# Patient Record
Sex: MALE | URBAN - METROPOLITAN AREA
[De-identification: names, ages, dates, MRNs, and addresses within clinical notes are randomized per-mention and may not be internally consistent; named-entity substitution may affect disease eponyms.]

---

## 2021-08-11 ENCOUNTER — APPOINTMENT (RX ONLY)
Dept: URBAN - METROPOLITAN AREA CLINIC 369 | Facility: CLINIC | Age: 26
Setting detail: DERMATOLOGY
End: 2021-08-11

## 2021-08-11 DIAGNOSIS — L738 OTHER SPECIFIED DISEASES OF HAIR AND HAIR FOLLICLES: ICD-10-CM

## 2021-08-11 DIAGNOSIS — L663 OTHER SPECIFIED DISEASES OF HAIR AND HAIR FOLLICLES: ICD-10-CM

## 2021-08-11 DIAGNOSIS — L73.9 FOLLICULAR DISORDER, UNSPECIFIED: ICD-10-CM

## 2021-08-11 DIAGNOSIS — A63.0 ANOGENITAL (VENEREAL) WARTS: ICD-10-CM

## 2021-08-11 PROBLEM — L02.426 FURUNCLE OF LEFT LOWER LIMB: Status: ACTIVE | Noted: 2021-08-11

## 2021-08-11 PROBLEM — L02.425 FURUNCLE OF RIGHT LOWER LIMB: Status: ACTIVE | Noted: 2021-08-11

## 2021-08-11 PROCEDURE — ? LIQUID NITROGEN

## 2021-08-11 PROCEDURE — ? TREATMENT REGIMEN

## 2021-08-11 PROCEDURE — 99203 OFFICE O/P NEW LOW 30 MIN: CPT | Mod: 25

## 2021-08-11 PROCEDURE — 17110 DESTRUCTION B9 LES UP TO 14: CPT

## 2021-08-11 PROCEDURE — ? COUNSELING

## 2021-08-11 PROCEDURE — ? PRESCRIPTION

## 2021-08-11 RX ORDER — CLINDAMYCIN PHOSPHATE 10 MG/ML
LOTION TOPICAL
Qty: 1 | Refills: 1 | Status: ERX | COMMUNITY
Start: 2021-08-11

## 2021-08-11 RX ORDER — DOXYCYCLINE 40 MG/1
CAPSULE ORAL
Qty: 30 | Refills: 1 | Status: ERX | COMMUNITY
Start: 2021-08-11

## 2021-08-11 RX ADMIN — DOXYCYCLINE: 40 CAPSULE ORAL at 00:00

## 2021-08-11 RX ADMIN — CLINDAMYCIN PHOSPHATE: 10 LOTION TOPICAL at 00:00

## 2021-08-11 ASSESSMENT — LOCATION SIMPLE DESCRIPTION DERM
LOCATION SIMPLE: GROIN
LOCATION SIMPLE: RIGHT THIGH
LOCATION SIMPLE: SCROTUM
LOCATION SIMPLE: PENIS
LOCATION SIMPLE: LEFT THIGH

## 2021-08-11 ASSESSMENT — LOCATION ZONE DERM
LOCATION ZONE: PENIS
LOCATION ZONE: LEG
LOCATION ZONE: GENITALIA
LOCATION ZONE: TRUNK

## 2021-08-11 ASSESSMENT — LOCATION DETAILED DESCRIPTION DERM
LOCATION DETAILED: RIGHT ANTERIOR SCROTUM
LOCATION DETAILED: RIGHT ANTERIOR DISTAL THIGH
LOCATION DETAILED: LEFT ANTERIOR DISTAL THIGH
LOCATION DETAILED: SUPRAPUBIC SKIN
LOCATION DETAILED: BASE OF THE PENIS

## 2021-08-11 NOTE — PROCEDURE: LIQUID NITROGEN
Duration Of Freeze Thaw-Cycle (Seconds): 3
Show Aperture Variable?: Yes
Render Post-Care Instructions In Note?: no
Consent: The patient's consent was obtained including but not limited to risks of crusting, scabbing, blistering, scarring, darker or lighter pigmentary change, recurrence, incomplete removal and infection.
Number Of Freeze-Thaw Cycles: 3 freeze-thaw cycles
Post-Care Instructions: I reviewed with the patient in detail post-care instructions. Patient is to wear sunprotection, and avoid picking at any of the treated lesions. Pt may apply Vaseline to crusted or scabbing areas.
Detail Level: Detailed
Medical Necessity Clause: This procedure was medically necessary because the lesions that were treated were:
Medical Necessity Information: It is in your best interest to select a reason for this procedure from the list below. All of these items fulfill various CMS LCD requirements except the new and changing color options.

## 2021-08-11 NOTE — PROCEDURE: TREATMENT REGIMEN
Initiate Treatment: Take 1 tablet of Oracea daily with food and water\\nApply Clindamycin lotion to affected areas on legs twice daily \\nApply Cicalfate to open wounds daily
Detail Level: Zone

## 2021-09-10 ENCOUNTER — APPOINTMENT (RX ONLY)
Dept: URBAN - METROPOLITAN AREA CLINIC 369 | Facility: CLINIC | Age: 26
Setting detail: DERMATOLOGY
End: 2021-09-10

## 2021-09-10 DIAGNOSIS — A63.0 ANOGENITAL (VENEREAL) WARTS: ICD-10-CM | Status: IMPROVED

## 2021-09-10 PROCEDURE — ? COUNSELING

## 2021-09-10 PROCEDURE — ? LIQUID NITROGEN

## 2021-09-10 PROCEDURE — 17110 DESTRUCTION B9 LES UP TO 14: CPT

## 2021-09-10 ASSESSMENT — LOCATION ZONE DERM: LOCATION ZONE: TRUNK

## 2021-09-10 ASSESSMENT — LOCATION SIMPLE DESCRIPTION DERM: LOCATION SIMPLE: GROIN

## 2021-09-10 ASSESSMENT — LOCATION DETAILED DESCRIPTION DERM: LOCATION DETAILED: SUPRAPUBIC SKIN

## 2021-09-10 NOTE — PROCEDURE: LIQUID NITROGEN
Render Post-Care Instructions In Note?: no
Post-Care Instructions: I reviewed with the patient in detail post-care instructions. Patient is to wear sunprotection, and avoid picking at any of the treated lesions. Pt may apply Vaseline to crusted or scabbing areas.
Number Of Freeze-Thaw Cycles: 3 freeze-thaw cycles
Show Topical Anesthesia Variable?: Yes
Medical Necessity Information: It is in your best interest to select a reason for this procedure from the list below. All of these items fulfill various CMS LCD requirements except the new and changing color options.
Medical Necessity Clause: This procedure was medically necessary because the lesions that were treated were:
Detail Level: Detailed
Consent: The patient's consent was obtained including but not limited to risks of crusting, scabbing, blistering, scarring, darker or lighter pigmentary change, recurrence, incomplete removal and infection.
Duration Of Freeze Thaw-Cycle (Seconds): 3

## 2021-09-24 ENCOUNTER — APPOINTMENT (RX ONLY)
Dept: URBAN - METROPOLITAN AREA CLINIC 369 | Facility: CLINIC | Age: 26
Setting detail: DERMATOLOGY
End: 2021-09-24

## 2021-09-24 DIAGNOSIS — A63.0 ANOGENITAL (VENEREAL) WARTS: ICD-10-CM

## 2021-09-24 PROCEDURE — ? COUNSELING

## 2021-09-24 PROCEDURE — ? TREATMENT REGIMEN

## 2021-09-24 PROCEDURE — 99213 OFFICE O/P EST LOW 20 MIN: CPT

## 2021-09-24 PROCEDURE — ? DEFER

## 2021-09-24 ASSESSMENT — LOCATION DETAILED DESCRIPTION DERM: LOCATION DETAILED: SUPRAPUBIC SKIN

## 2021-09-24 ASSESSMENT — LOCATION SIMPLE DESCRIPTION DERM: LOCATION SIMPLE: GROIN

## 2021-09-24 ASSESSMENT — LOCATION ZONE DERM: LOCATION ZONE: TRUNK

## 2021-09-24 ASSESSMENT — AREA OF CONDYLOMA IN CM2: TOTAL AREA OF CONDYLOMA IN CM2: 2

## 2021-09-24 NOTE — PROCEDURE: DEFER
Introduction Text (Please End With A Colon): The following treatment was deferred:
Procedure To Be Performed At Next Visit: Liquid nitrogen
Detail Level: Detailed
Instructions (Optional): Defer cryotherapy due to crusted open blister. May resume therapy once wound has healed. Lesions have improved.

## 2021-09-24 NOTE — PROCEDURE: TREATMENT REGIMEN
Detail Level: Zone
Initiate Treatment: Apply bacitracin for open wounds and scabs twice a day for two weeks

## 2021-10-08 ENCOUNTER — APPOINTMENT (RX ONLY)
Dept: URBAN - METROPOLITAN AREA CLINIC 369 | Facility: CLINIC | Age: 26
Setting detail: DERMATOLOGY
End: 2021-10-08

## 2021-10-08 DIAGNOSIS — A63.0 ANOGENITAL (VENEREAL) WARTS: ICD-10-CM | Status: INADEQUATELY CONTROLLED

## 2021-10-08 PROCEDURE — ? TREATMENT REGIMEN

## 2021-10-08 PROCEDURE — 99214 OFFICE O/P EST MOD 30 MIN: CPT

## 2021-10-08 PROCEDURE — ? COUNSELING

## 2021-10-08 PROCEDURE — ? PRESCRIPTION

## 2021-10-08 RX ORDER — IMIQUIMOD 50 MG/G
CREAM TOPICAL
Qty: 12 | Refills: 1 | Status: ERX | COMMUNITY
Start: 2021-10-08

## 2021-10-08 RX ADMIN — IMIQUIMOD: 50 CREAM TOPICAL at 00:00

## 2021-10-08 ASSESSMENT — LOCATION SIMPLE DESCRIPTION DERM: LOCATION SIMPLE: SCROTUM

## 2021-10-08 ASSESSMENT — LOCATION ZONE DERM: LOCATION ZONE: GENITALIA

## 2021-10-08 ASSESSMENT — LOCATION DETAILED DESCRIPTION DERM: LOCATION DETAILED: RIGHT ANTERIOR SCROTUM

## 2021-10-08 NOTE — PROCEDURE: TREATMENT REGIMEN
Initiate Treatment: Aldara 3x/week x16 weeks
Detail Level: Simple
Plan: Recheck in 2 months to check progress